# Patient Record
Sex: FEMALE | Race: BLACK OR AFRICAN AMERICAN
[De-identification: names, ages, dates, MRNs, and addresses within clinical notes are randomized per-mention and may not be internally consistent; named-entity substitution may affect disease eponyms.]

---

## 2020-01-01 NOTE — EMERGENCY ROOM REPORT
History of Present Illness


General


Chief Complaint:  Flu Like Symptoms


Source:  Patient





Present Illness


HPI


64-year-old female history of smoking in the past, hypertension, presents with 

cough, congestion since 12/20/2019, patient took a round of antibiotics with no 

relief, patient endorses cough with sputum production and some shortness of 

breath, no aggravating relieving factors severity is moderate, constant patient 

presents for evaluation


Allergies:  


Coded Allergies:  


     PENICILLINS (Unverified  Allergy, Mild, RASH, 6/1/14)


     CODEINE (Unverified  Allergy, Unknown, 6/1/14)





Patient History


Past Medical History:  see triage record


Reviewed Nursing Documentation:  PMH: Agreed; PSxH: Agreed





Nursing Documentation-PMH


Hx Cardiac Problems:  No - Hypothyroidism


Hx Hypertension:  Yes


Hx Pacemaker:  No


Hx Asthma:  No


Hx COPD:  No


Hx Diabetes:  Yes


Hx Cancer:  No


Hx Neurological Problems:  No


Hx Cerebrovascular Accident:  No


Hx Seizures:  No





Review of Systems


All Other Systems:  negative except mentioned in HPI





Physical Exam





Vital Signs








  Date Time  Temp Pulse Resp B/P (MAP) Pulse Ox O2 Delivery O2 Flow Rate FiO2


 


1/1/20 17:04 98.2 108 18 121/73 (89) 94   








Sp02 EP Interpretation:  reviewed, normal


General Appearance:  well appearing, no apparent distress, alert


Head:  normocephalic, atraumatic


Eyes:  bilateral eye PERRL, bilateral eye EOMI


ENT:  uvula midline, moist mucus membranes


Neck:  supple, thyroid normal, supple/symm/no masses


Respiratory:  no respiratory distress, no retraction, no accessory muscle use, 

decreased breath sounds - Bilaterally, moderate


Cardiovascular #1:  normal peripheral pulses, regular rate, rhythm, no edema, 

no gallop, no murmur


Gastrointestinal:  non tender, soft, no guarding, no rebound


Musculoskeletal:  normal inspection


Neurologic:  alert, oriented x3


Psychiatric:  mood/affect normal


Skin:  no rash, warm/dry





Medical Decision Making


Diagnostic Impression:  


 Primary Impression:  


 Upper respiratory infection


 Qualified Codes:  J06.9 - Acute upper respiratory infection, unspecified


 Additional Impression:  


 COPD exacerbation


ER Course


64-year-old female presents with cough, congestion, shortness of breath 

differential diagnosis include COPD exacerbation, URI, pneumonia


Chest x-ray shows no acute infiltrate, patient improved with duo nebs and 

steroids


With a smoking history, patient significantly improved will provide prednisone 

burst as well as Albuterol disposition home with return precautions





Laboratory Tests








Test


  1/1/20


17:25


 


White Blood Count


  6.5 K/UL


(4.8-10.8)


 


Red Blood Count


  4.77 M/UL


(4.20-5.40)


 


Hemoglobin


  15.3 G/DL


(12.0-16.0)


 


Hematocrit


  44.4 %


(37.0-47.0)


 


Mean Corpuscular Volume 93 FL (80-99)  


 


Mean Corpuscular Hemoglobin


  32.0 PG


(27.0-31.0)  H


 


Mean Corpuscular Hemoglobin


Concent 34.3 G/DL


(32.0-36.0)


 


Red Cell Distribution Width


  9.9 %


(11.6-14.8)  L


 


Platelet Count


  261 K/UL


(150-450)


 


Mean Platelet Volume


  6.9 FL


(6.5-10.1)


 


Neutrophils (%) (Auto)


  53.5 %


(45.0-75.0)


 


Lymphocytes (%) (Auto)


  30.8 %


(20.0-45.0)


 


Monocytes (%) (Auto)


  13.7 %


(1.0-10.0)  H


 


Eosinophils (%) (Auto)


  1.0 %


(0.0-3.0)


 


Basophils (%) (Auto)


  1.0 %


(0.0-2.0)


 


Sodium Level


  139 MMOL/L


(136-145)


 


Potassium Level


  4.0 MMOL/L


(3.5-5.1)


 


Chloride Level


  102 MMOL/L


()


 


Carbon Dioxide Level


  23 MMOL/L


(21-32)


 


Anion Gap


  14 mmol/L


(5-15)


 


Blood Urea Nitrogen


  18 mg/dL


(7-18)


 


Creatinine


  0.9 MG/DL


(0.55-1.30)


 


Estimate Glomerular


Filtration Rate > 60 mL/min


(>60)


 


Glucose Level


  201 MG/DL


()  H


 


Calcium Level


  9.6 MG/DL


(8.5-10.1)


 


Total Bilirubin


  0.2 MG/DL


(0.2-1.0)


 


Aspartate Amino Transferase


(AST) 25 U/L (15-37)


 


 


Alanine Aminotransferase (ALT)


  45 U/L (12-78)


 


 


Alkaline Phosphatase


  111 U/L


()


 


Troponin I


  0.000 ng/mL


(0.000-0.056)


 


Total Protein


  7.6 G/DL


(6.4-8.2)


 


Albumin


  3.7 G/DL


(3.4-5.0)


 


Globulin 3.9 g/dL  


 


Albumin/Globulin Ratio


  0.9 (1.0-2.7)


L








EKG Diagnostic Results


EKG Time:  17:19


EP Interpretation:  NSR, rate 98, QTc 441, no acute ST elevations, normal axis





Rhythm Strip Diag. Results


Rhythm Strip Time:  17:46


EP Interpretation:  yes


Rate:  91


Rhythm:  NSR, no PVC's, no ectopy





Chest X-Ray Diagnostic Results


Chest X-Ray Diagnostic Results :  


   Chest X-Ray Ordered:  Yes


   # of Views/Limited/Complete:  1 View


   Indication:  Shortness of Breath


   EP Interpretation:  Yes


   Interpretation:  other - Large lung volumes side


   Impression:  Other - Large lung volumes


   Electronically Signed by:  Fly Roger MD





Last Vital Signs








  Date Time  Temp Pulse Resp B/P (MAP) Pulse Ox O2 Delivery O2 Flow Rate FiO2


 


1/1/20 17:04 98.2 108 18 121/73 (89) 94   








Condition:  Stable


Scripts


Prednisone* (PREDNISONE*) 50 Mg Tablet


50 MG ORAL DAILY, #4 TAB 0 Refills


   Prov: Fly Roger MD         1/1/20 


Albuterol Sulfate* (ALBUTEROL SULFATE MDI*) 8.5 Gm Hfa.aer.ad


2 PUFF INH Q4H PRN for cough/wheezing, #1 EA 0 Refills


   Prov: Fly Roger MD         1/1/20


Referrals:  


NOT CHOSEN IPA/MD,REFERRING (PCP)











[facility]





[facility]


Patient Instructions:  Chronic Obstructive Pulmonary Disease Exacerbation, 

Upper Respiratory Infection, Adult, Easy-to-Read





Additional Instructions:  


The patient was provided with discharge instructions, notified to follow-up 

with a primary care doctor and or specialist in the next 24-48 hours, and to 

return to the ED if they have worsening of their symptoms. 





Please note that this report is being documented using [employer]AG[employer] technology.


This can lead to erroneous entry secondary to incorrect interpretation by the 

dictating instrument.











Fly Roger MD Jan 1, 2020 17:30

## 2020-01-01 NOTE — NUR
ED Nurse Note:



Talia walked in to ED from home c/o cough and body ache since 12/20/19. Stated 
that she went to [place] for 2 weeks and got her symptoms from there, went to 
the doctor and was prescribed by antibiotic and inhaler, finished her atbx but 
no relief. No SOB. Afebrile. VSS.

## 2020-01-02 NOTE — DIAGNOSTIC IMAGING REPORT
Indication: Cough

 

Technique: One view of the chest

 

Comparison: 6/20/2016

 

Findings: Better inspiration currently. Aorta is tortuous and calcified. Lungs and

pleural spaces are clear. The heart size is normal

 

Impression: Negative

## 2020-01-10 NOTE — NUR
ED Nurse Note:





pt resting at this time, ambulated w/ steady gait to restroom and void x 1. 
will cont monitor. safety measures in place, pt advised to notify staff if need 
assist.

## 2020-01-10 NOTE — NUR
ED Nurse Note:





blood specimen obtained via iv, 20g on right AC, site prepped with alcohol, pt 
tolerated well.

## 2020-01-10 NOTE — NUR
ED Nurse Note:





pt came to ED w/ complaints of sob, heart palpitations started today with 
feeling jittery, pt states she took atorvastatin for cholesterol and also 
breathing tx at home. noted vss, sinus rhythm on cardiac monitor, no sx resp 
distress, o2sat 100% on RA, LS=clear. will cont monitor.

## 2020-01-10 NOTE — DIAGNOSTIC IMAGING REPORT
EXAM:

  XR Chest, 1 View

 

CLINICAL HISTORY:

  SOB

 

TECHNIQUE:

  Frontal view of the chest.

 

COMPARISON:

  Chest x-ray 1/1/2020

 

FINDINGS:

  Lungs:  Unremarkable.  No consolidation.

  Pleural space:  No pleural effusion. No pneumothorax.

  Heart:  Unremarkable.  No cardiomegaly.

 

IMPRESSION:     

  No acute cardiopulmonary abnormality.

## 2020-01-11 NOTE — NUR
ED Nurse Note:





pt cleared to be d/c per ERMD, pt states she feels better and doesn't have 
palpitations anymore, denies anxiety anymore at this time, pt vss, resp even 
and unlabored on RA, iv d/c and id band removed, dressing applied, pt advised 
to follow up with pcp or return to ed if changes in condition, pt education 
done via discussion and handout, pt verbalized understanding and agrees with 
plan, left w/ all belongings.

## 2020-03-21 NOTE — EMERGENCY ROOM REPORT
History of Present Illness


General


Chief Complaint:  Upper Extremity Injury


Source:  Patient





Present Illness


HPI


Patient presents with left arm pain.  It is intermittent.  Does not seem to be 

related to exertion.  Does not seem to be related with breathing.  She denies 

any fever or productive cough.  There is some nausea or strange feeling in her 

epigastric area when she does have that discomfort.  She tried taking 81 mg of 

aspirin earlier today.  She rates the pain 5-6/10 at this time.  Pain does not 

seem to be related to moving her arm either.  Chronic swelling in her feet for 

many years.  There is no calf tenderness.  No fevers or chills.  No productive 

cough.  No anxiety.





Patient is diabetic and also has high blood pressure.  No exposure to cigarette 

smoke.  No family history.





She had some cardiac work-up in the past but no treadmill.





She states her diabetes is well controlled.





History of hypothyroidism.


COVID-19 risk:Contact w/high r:  No


COVID-19 risk:Travel to affect:  No


Has patient experienced corona:  No


Allergies:  


Coded Allergies:  


     PENICILLINS (Unverified  Allergy, Mild, RASH, 6/1/14)


     CODEINE (Unverified  Allergy, Unknown, 6/1/14)





Patient History


Past Medical History:  see triage record


Social History:  Denies: smoking, alcohol use, drug use


Social History Narrative


Caretaker


Reviewed Nursing Documentation:  PMH: Agreed; PSxH: Agreed





Nursing Documentation-PMH


Hx Cardiac Problems:  No - Hypothyroidism


Hx Hypertension:  Yes


Hx Pacemaker:  No


Hx Asthma:  No


Hx COPD:  No


Hx Diabetes:  Yes


Hx Cancer:  No


Hx Neurological Problems:  No


Hx Cerebrovascular Accident:  No


Hx Seizures:  No





Review of Systems


All Other Systems:  negative except mentioned in HPI





Physical Exam





Vital Signs








  Date Time  Temp Pulse Resp B/P (MAP) Pulse Ox O2 Delivery O2 Flow Rate FiO2


 


3/21/20 20:13 98.2 88 16 107/71 (83) 98 Room Air  








Sp02 EP Interpretation:  reviewed, normal


General Appearance:  well appearing, no apparent distress, GCS 15, non-toxic


Head:  normocephalic


Eyes:  bilateral eye normal inspection, bilateral eye PERRL, bilateral eye EOMI


ENT:  moist mucus membranes


Neck:  supple


Respiratory:  chest non-tender, lungs clear, normal breath sounds


Cardiovascular #1:  regular rate, rhythm, no edema


Cardiovascular #2:  2+ radial (R)


Gastrointestinal:  normal inspection, normal bowel sounds, non tender, no mass, 

non-distended


Genitourinary:  no CVA tenderness


Musculoskeletal:  back normal, normal range of motion, no calf tenderness, gait/

station normal, Siri's Sign negative


Neurologic:  alert, oriented x3, grossly normal


Psychiatric:  mood/affect normal


Skin:  no rash, warm/dry





Medical Decision Making


Diagnostic Impression:  


 Primary Impression:  


 Left arm pain


 Additional Impression:  


 Diabetes


 Qualified Codes:  E11.9 - Type 2 diabetes mellitus without complications


ER Course


Patient presents with left upper arm pain with a history of diabetes and 

hypertension.  Differential includes acute myocardial infarction, unstable 

angina, muscle strain, referred pain from GI tract amongst others.  Evaluation 

with EKG, chest x-ray and labs.  Patient treated with aspirin and Tylenol and 

Pepcid.  Patient placed on the cardiac monitor.  As the patient is diabetic 

there could be referred pain to the left arm from cardiac or GI origin.





EKG with sinus rhythm and nonspecific ST-T wave changes without suggestion of 

injury.  Chest x-ray no infiltrates.  Laboratory remarkable only for minimally 

elevated glucose and negative troponin.





Patient's pain improved with Tylenol and aspirin.





Discussed findings with patient and treatment plan.





No medical emergency at this time.





Patient stable for outpatient observation and treatment.





Laboratory Tests








Test


  3/21/20


20:45


 


White Blood Count


  6.9 K/UL


(4.8-10.8)


 


Red Blood Count


  4.49 M/UL


(4.20-5.40)


 


Hemoglobin


  14.0 G/DL


(12.0-16.0)


 


Hematocrit


  42.6 %


(37.0-47.0)


 


Mean Corpuscular Volume 95 FL (80-99)  


 


Mean Corpuscular Hemoglobin


  31.3 PG


(27.0-31.0)  H


 


Mean Corpuscular Hemoglobin


Concent 33.0 G/DL


(32.0-36.0)


 


Red Cell Distribution Width


  11.9 %


(11.6-14.8)


 


Platelet Count


  243 K/UL


(150-450)


 


Mean Platelet Volume


  8.2 FL


(6.5-10.1)


 


Neutrophils (%) (Auto)


  53.2 %


(45.0-75.0)


 


Lymphocytes (%) (Auto)


  36.3 %


(20.0-45.0)


 


Monocytes (%) (Auto)


  9.0 %


(1.0-10.0)


 


Eosinophils (%) (Auto)


  0.7 %


(0.0-3.0)


 


Basophils (%) (Auto)


  0.7 %


(0.0-2.0)


 


Prothrombin Time


  10.3 SEC


(9.30-11.50)


 


Prothrombin Time INR 1.0 (0.9-1.1)  


 


Activated Partial


Thromboplast Time 26 SEC (23-33)


 


 


Sodium Level


  140 MMOL/L


(136-145)


 


Potassium Level


  3.7 MMOL/L


(3.5-5.1)


 


Chloride Level


  104 MMOL/L


()


 


Carbon Dioxide Level


  26 MMOL/L


(21-32)


 


Anion Gap


  10 mmol/L


(5-15)


 


Blood Urea Nitrogen


  19 mg/dL


(7-18)  H


 


Creatinine


  0.8 MG/DL


(0.55-1.30)


 


Estimated Glomerular


Filtration Rate > 60 mL/min


(>60)


 


Glucose Level


  156 MG/DL


()  H


 


Calcium Level


  10.7 MG/DL


(8.5-10.1)  H


 


Total Bilirubin


  0.3 MG/DL


(0.2-1.0)


 


Aspartate Amino Transferase


(AST) 17 U/L (15-37)


 


 


Alanine Aminotransferase (ALT)


  27 U/L (12-78)


 


 


Alkaline Phosphatase


  104 U/L


()


 


Total Creatine Kinase


  152 U/L


()


 


Troponin I


  0.000 ng/mL


(0.000-0.056)


 


Pro-B-Type Natriuretic Peptide


  11 pg/mL


(0-125)


 


Total Protein


  7.3 G/DL


(6.4-8.2)


 


Albumin


  4.1 G/DL


(3.4-5.0)


 


Globulin 3.2 g/dL  


 


Albumin/Globulin Ratio 1.3 (1.0-2.7)  








EKG Diagnostic Results


Rate:  normal


Rhythm:  NSR


ST Segments:  no acute changes





Rhythm Strip Diag. Results


EP Interpretation:  yes


Rhythm:  NSR, no PVC's, no ectopy





Chest X-Ray Diagnostic Results


Chest X-Ray Diagnostic Results :  


   Chest X-Ray Ordered:  Yes


   # of Views/Limited/Complete:  1 View


   Indication:  Other


   EP Interpretation:  Yes


   Interpretation:  no consolidation, no effusion, no pneumothorax


   Impression:  No acute disease


   Electronically Signed by:  Electronically signed by Garret Torres MD





Last Vital Signs








  Date Time  Temp Pulse Resp B/P (MAP) Pulse Ox O2 Delivery O2 Flow Rate FiO2


 


3/21/20 22:09 98.2 79 16 124/64 98 Room Air  








Status:  improved


Scripts


Acetaminophen (Tylenol) 325 Mg Tablet


650 MG ORAL Q6H PRN for Prn Pain/Headache/Temp > 101, #20 TAB 0 Refills


   Prov: Garret Torres MD         3/21/20 


Famotidine* (Pepcid 20mg tablet*) 20 Mg Tablet


20 MG ORAL DAILY, #20 TAB 0 Refills


   Prov: Garret Torres MD         3/21/20


Referrals:  


NOT CHOSEN IPA/MD,REFERRING (PCP)











Garret Torres MD Mar 21, 2020 21:03

## 2020-03-21 NOTE — DIAGNOSTIC IMAGING REPORT
EXAM:

  XR Chest, 1 View

 

CLINICAL HISTORY:

  CP

 

TECHNIQUE:

  Frontal view of the chest.

 

COMPARISON:

  1/10/20

 

FINDINGS:

  Lungs:  No significant abnormality.  No consolidation.

  Pleural space:  No significant abnormality.  No pneumothorax.

  Heart:  Stable cardiomediastinal silhouette.

  Mediastinum:  See above.

  Bones/joints:  No acute osseous abnormality.

 

IMPRESSION:     

  No acute cardiopulmonary process.

## 2021-03-01 ENCOUNTER — HOSPITAL ENCOUNTER (EMERGENCY)
Dept: HOSPITAL 72 - EMR | Age: 66
Discharge: HOME | End: 2021-03-01
Payer: COMMERCIAL

## 2021-03-01 VITALS — SYSTOLIC BLOOD PRESSURE: 145 MMHG | DIASTOLIC BLOOD PRESSURE: 81 MMHG

## 2021-03-01 VITALS — DIASTOLIC BLOOD PRESSURE: 81 MMHG | SYSTOLIC BLOOD PRESSURE: 145 MMHG

## 2021-03-01 VITALS — HEIGHT: 59 IN | WEIGHT: 185 LBS | BODY MASS INDEX: 37.29 KG/M2

## 2021-03-01 DIAGNOSIS — M25.512: ICD-10-CM

## 2021-03-01 DIAGNOSIS — Z88.0: ICD-10-CM

## 2021-03-01 DIAGNOSIS — S00.03XA: Primary | ICD-10-CM

## 2021-03-01 DIAGNOSIS — F17.200: ICD-10-CM
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
## 2021-03-01 DIAGNOSIS — Z88.6: ICD-10-CM
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
## 2021-03-01 DIAGNOSIS — E11.9: ICD-10-CM

## 2021-03-01 PROCEDURE — 99284 EMERGENCY DEPT VISIT MOD MDM: CPT

## 2021-03-01 PROCEDURE — 70450 CT HEAD/BRAIN W/O DYE: CPT

## 2021-03-01 NOTE — EMERGENCY ROOM REPORT
History of Present Illness


General


Chief Complaint:  To Be Triaged





Present Illness


HPI


66-year-old female presents to the emergency department complaining of 4/10 in 

severity left-sided headache, tenderness and swelling to the scalp status post 

having a large piece of wood fall and hit her on the head prior to arrival.  

Patient denies loss of consciousness.  She does report nausea and some 

dizziness.  She denies taking blood thinning medications.  She does report some 

left-sided shoulder pain as well but states it is mostly in the musculature area

she denies suspicion of fractures.  She denies midline neck or back pain.  She 

denies visual changes or loss of vision.  Patient states she has not taken any 

medications prior to arrival.  No other aggravating or relieving factors.  She 

denies open wounds or bleeding.


Allergies:  


Coded Allergies:  


     PENICILLINS (Unverified  Allergy, Mild, RASH, 6/1/14)


     CODEINE (Unverified  Allergy, Unknown, 6/1/14)





COVID-19 Screening


Contact w/high risk pt:  No


Recent Travel to affected area:  No


Experienced COVID-19 symptoms?:  No





Patient History


Past Medical History:  see triage record


Past Surgical History:  none


Pertinent Family History:  unable to obtain


Social History:  Reports: smoking


Reviewed Nursing Documentation:  PMH: Agreed; PSxH: Agreed





Nursing Documentation-PMH


Hx Cardiac Problems:  No - Hypothyroidism


Hx Hypertension:  Yes


Hx Pacemaker:  No


Hx Asthma:  No


Hx COPD:  No


Hx Diabetes:  Yes


Hx Cancer:  No


Hx Neurological Problems:  No


Hx Cerebrovascular Accident:  No


Hx Seizures:  No





Review of Systems


All Other Systems:  negative except mentioned in HPI





Physical Exam


Sp02 EP Interpretation:  reviewed, normal


General Appearance:  no apparent distress, alert, GCS 15, non-toxic


Head:  normocephalic, other - TTp to the left parietal area, no palpable 

hematoma, no lacerations.


Eyes:  bilateral eye normal inspection, bilateral eye PERRL, bilateral eye EOMI


ENT:  hearing grossly normal, normal voice


Neck:  full range of motion


Respiratory:  lungs clear, normal breath sounds, speaking full sentences


Cardiovascular #1:  regular rate, rhythm


Musculoskeletal:  back normal, normal range of motion, gait/station normal, tend

er - TTp to the left trapezius area, no localized bony ttp, FROM. no weakness. 

normal strength., other - FROM of left shoulder without clicking/crepitus.


Neurologic:  alert, motor strength/tone normal, oriented x3, sensory intact, 

responsive, speech normal


Psychiatric:  judgement/insight normal


Skin:  normal color, other - no lacerations or abrasions.





Medical Decision Making


PA Attestation


Dr. Mena is my supervising Physician whom patient management has been 

discussed with.


Diagnostic Impression:  


   Primary Impression:  


   Contusion of head


   Qualified Codes:  S00.03XA - Contusion of scalp, initial encounter


   Additional Impression:  


   Shoulder pain, left


   Qualified Codes:  M25.512 - Pain in left shoulder


ER Course


66-year-old female presents to the emergency department complaining of 4/10 in 

severity left-sided headache, tenderness and swelling to the scalp status post 

having a large piece of wood fall and hit her on the head prior to arrival.  

Patient denies loss of consciousness.  She does report nausea and some 

dizziness.  She denies taking blood thinning medications.  She does report some 

left-sided shoulder pain as well but states it is mostly in the musculature area

she denies suspicion of fractures.  She denies midline neck or back pain.  She 

denies visual changes or loss of vision.  Patient states she has not taken any 

medications prior to arrival.  No other aggravating or relieving factors.  She 

denies open wounds or bleeding.








Ddx considered but are not limited to Fracture, dislocation, contusion, 

concussion Sprain/Strain/Spasm, hematoma





Vital signs: are WNL, pt. is afebrile





H&PE are most consistent with  contusion, no evidence of focal neurological 

deficit, no  loss of consciousness.





ORDERS:  none required at this time.  No focal neurological defects.








ED INTERVENTIONS:





-Tylenol 650mg PO











- Pt.  verbalize's her understanding and agreement with proposed treatment plan.







DISCHARGE: At this time pt. is stable for d/c to home. Will provide printed 

patient care instructions, and any necessary prescriptions. Care plan and follow

up instructions have been discussed with the patient prior to discharge.


CT/MRI/US Diagnostic Results


CT/MRI/US Diagnostic Results :  


   Imaging Test Ordered:  CT head No Contrast


   Impression


" No evidence of acute fracture or intracranial hemorrhage/acute intracranial 

pathology."   --Per official radiology report- Please see report for specific 

details.


Status:  improved


Disposition:  HOME, SELF-CARE


Condition:  Stable


Scripts


Methocarbamol* (ROBAXIN-750*) 750 Mg Tablet


750 MG PO TID, #21 TAB 0 Refills


   Prov: Danielle Caba         3/1/21 


Acetaminophen* (TYLENOL EXTRA STRENGTH*) 500 Mg Tablet


500 MG ORAL Q6H, #30 TAB 0 Refills


   Prov: Danielle Caba         3/1/21


Referrals:  


[facility]





[facility]


Patient Instructions:  Head Injury, Adult, Easy-to-Read





Additional Instructions:  


Take medications as directed. 


 ** Follow up with a Primary Care Provider in 3-5 days, even if your symptoms 

have resolved. ** 





--Please review list of primary care clinics, if you do not already have a 

primary care provider





Return sooner to ED if new symptoms occur, or current symptoms become worse. 





- Please note that this Emergency Department Report was dictated using [employer] technology software, occasionally this can lead to 

erroneous entry secondary to interpretation by the dictation equipment.











Danielle Caba               Mar 1, 2021 15:50

## 2021-03-01 NOTE — DIAGNOSTIC IMAGING REPORT
Indications: Head pain, status post trauma

 

Technique: Spiral acquisitions obtained through the brain. Angled axial and coronal 5

x 5 mm slices were reconstructed. Total dose length product 1018 mGycm.  CTDI vol(s)

53 mGy. Dose reduction achieved using automated exposure control

 

Comparison: 11/13/2015

 

Findings: No acute intracranial hemorrhage or edema. No mass effect nor midline

shift. Normal gray-white differentiation. Normal size ventricles and extra axial CSF

spaces. Intact calvarium. Visualized orbits and sinuses are unremarkable.

 

Impression: Negative

 

 

 

 

 

The CT scanner at [facility] is accredited by the American College of

Radiology and the scans are performed using protocols designed to limit radiation

exposure to as low as reasonably achievable to attain images of sufficient resolution

adequate for diagnostic evaluation.